# Patient Record
Sex: MALE
[De-identification: names, ages, dates, MRNs, and addresses within clinical notes are randomized per-mention and may not be internally consistent; named-entity substitution may affect disease eponyms.]

---

## 2017-07-11 ENCOUNTER — APPOINTMENT (OUTPATIENT)
Dept: ORTHOPEDIC SURGERY | Facility: CLINIC | Age: 57
End: 2017-07-11

## 2017-07-11 DIAGNOSIS — M25.552 PAIN IN LEFT HIP: ICD-10-CM

## 2017-07-11 DIAGNOSIS — M25.562 PAIN IN LEFT KNEE: ICD-10-CM

## 2017-07-11 RX ORDER — METHYLPREDNISOLONE 4 MG/1
4 TABLET ORAL
Qty: 1 | Refills: 0 | Status: ACTIVE | COMMUNITY
Start: 2017-07-11 | End: 1900-01-01

## 2017-07-16 ENCOUNTER — EMERGENCY (EMERGENCY)
Facility: HOSPITAL | Age: 57
LOS: 0 days | Discharge: HOME | End: 2017-07-16
Attending: EMERGENCY MEDICINE | Admitting: EMERGENCY MEDICINE
Payer: COMMERCIAL

## 2017-07-16 VITALS
SYSTOLIC BLOOD PRESSURE: 155 MMHG | DIASTOLIC BLOOD PRESSURE: 93 MMHG | HEART RATE: 110 BPM | TEMPERATURE: 98 F | WEIGHT: 177.91 LBS | OXYGEN SATURATION: 100 % | RESPIRATION RATE: 17 BRPM | HEIGHT: 73 IN

## 2017-07-16 DIAGNOSIS — M54.32 SCIATICA, LEFT SIDE: ICD-10-CM

## 2017-07-16 PROCEDURE — 99284 EMERGENCY DEPT VISIT MOD MDM: CPT

## 2017-07-16 RX ORDER — DEXAMETHASONE 0.5 MG/5ML
6 ELIXIR ORAL ONCE
Qty: 0 | Refills: 0 | Status: COMPLETED | OUTPATIENT
Start: 2017-07-16 | End: 2017-07-16

## 2017-07-16 RX ORDER — SODIUM CHLORIDE 9 MG/ML
1000 INJECTION INTRAMUSCULAR; INTRAVENOUS; SUBCUTANEOUS ONCE
Qty: 0 | Refills: 0 | Status: COMPLETED | OUTPATIENT
Start: 2017-07-16 | End: 2017-07-16

## 2017-07-16 RX ORDER — MORPHINE SULFATE 50 MG/1
4 CAPSULE, EXTENDED RELEASE ORAL ONCE
Qty: 0 | Refills: 0 | Status: DISCONTINUED | OUTPATIENT
Start: 2017-07-16 | End: 2017-07-16

## 2017-07-16 RX ORDER — ONDANSETRON 8 MG/1
4 TABLET, FILM COATED ORAL ONCE
Qty: 0 | Refills: 0 | Status: COMPLETED | OUTPATIENT
Start: 2017-07-16 | End: 2017-07-16

## 2017-07-16 RX ORDER — KETOROLAC TROMETHAMINE 30 MG/ML
30 SYRINGE (ML) INJECTION ONCE
Qty: 0 | Refills: 0 | Status: DISCONTINUED | OUTPATIENT
Start: 2017-07-16 | End: 2017-07-16

## 2017-07-16 RX ADMIN — Medication 6 MILLIGRAM(S): at 12:12

## 2017-07-16 RX ADMIN — Medication 30 MILLIGRAM(S): at 12:23

## 2017-07-16 RX ADMIN — MORPHINE SULFATE 4 MILLIGRAM(S): 50 CAPSULE, EXTENDED RELEASE ORAL at 13:08

## 2017-07-16 RX ADMIN — SODIUM CHLORIDE 1000 MILLILITER(S): 9 INJECTION INTRAMUSCULAR; INTRAVENOUS; SUBCUTANEOUS at 12:13

## 2017-07-16 RX ADMIN — ONDANSETRON 4 MILLIGRAM(S): 8 TABLET, FILM COATED ORAL at 13:08

## 2017-07-16 RX ADMIN — Medication 30 MILLIGRAM(S): at 12:12

## 2017-07-16 NOTE — ED PROVIDER NOTE - OBJECTIVE STATEMENT
56 y/o M presents to the ED c/o back pain. The pt provides that he started having back pain since 5 days which gradually increased, which radiates down his left leg associated with numbness. Pt notes that he took a pack of steroids, which hasn't helped. No h/o trauma, headache, fever, chills, dizziness, cp, cough, sob, abd pain, nvd, or urinary incontinence.

## 2017-07-16 NOTE — ED PROVIDER NOTE - CONSTITUTIONAL, MLM
normal... Mod distress secondary to pain, anxious, awake, alert, oriented to person, place, time/situation

## 2017-07-16 NOTE — ED PROVIDER NOTE - MEDICAL DECISION MAKING DETAILS
56 y/o M c/o back pain, to obtain pain control, xray, reassess. 56 y/o M c/o back pain, to obtain pain control, xray, reassess.  No red flags for cord compression, epidural abscess, or epidural hematoma.

## 2017-07-16 NOTE — ED PROVIDER NOTE - CHPI ED SYMPTOMS NEG
no chills/no blood in stool/no burning urination/no diarrhea/no fever/no dysuria/no vomiting/no abdominal distension/no nausea/no hematuria

## 2017-07-16 NOTE — ED ADULT NURSE NOTE - CHPI ED SYMPTOMS NEG
no vomiting/no decreased eating/drinking/no numbness/no weakness/no chills/no dizziness/no fever/no nausea/no tingling

## 2017-07-16 NOTE — ED PROVIDER NOTE - RADIATION
IP Acute Occupational Therapy Initial Evaluation  Plan of Care Note    Diagnosis:  1. Acute on chronic diastolic congestive heart failure    2. Acute exacerbation of chronic obstructive pulmonary disease (COPD)    3. Pulmonary emphysema, unspecified emphysema type        Co-morbidities:   Patient Active Problem List   Diagnosis   • Coronary artery disease involving native coronary artery of native heart without angina pectoris   • Benign hypertension   • Pure hypercholesterolemia   • Acquired hypothyroidism   • Attention deficit hyperactivity disorder (ADHD), predominantly inattentive type   • Bipolar depression   • Chronic idiopathic gout of multiple sites   • GERD without esophagitis   • Emphysema/COPD   • Pain syndrome, chronic   • Spinal stenosis of lumbar region   • Fibromyalgia   • On postmenopausal hormone replacement therapy   • Anxiety   • Rosacea   • Encounter for long-term current use of high risk medication   • Chronic Lumbar facet joint pain       Precautions:       Below is key subjective and objective information from the last 24 hours.  For further details, please refer to the OT Assess/Treat/Goals flowsheet.    Subjective:       Prior Living Situation:  Type of Home: House (02/12/17 1000)  Lives With: Adult children (02/12/17 1000)    Objective:  ADLs:  Self Cares/ADL's  Grooming Assistance: Modified independent;Standing at sink (02/12/17 1000)  Grooming/Oral Hygiene Deficit: Teeth care (02/12/17 1000)    Household Mobility:  Household Mobility  Supine to Sit: Independent (02/12/17 1000)  Sit to Supine: Independent (02/12/17 1000)  Sit to Stand: Supervision (02/12/17 1000)  Stand to Sit: Supervision (02/12/17 1000)  Sitting - Static: Independent (02/12/17 1000)  Sitting - Dynamic: Independent (02/12/17 1000)  Standing - Static: Supervision (02/12/17 1000)  Standing - Dynamic: Supervision (02/12/17 1000)    Home Management:       Education:   On this date, the patient was educated on role of OT.     The response to education was: Verbalizes understanding.    Task Modification: clinical decision making of low complexity, no task modification    Assessment:  Patient presents at baseline with concerns which was independent with mobility  and ADLs . Emphasis of session included OT evaluation and ADL standing at sink.  No concerns with functional mobility or ability to participate in ADLs.  Patient reported that less than 1x/month, her pain is so bad that she needs her daughter to help with dressing/shower activities and that she struggles to get up/down the stairs.  She reported that this is very infrequent and only due to pain.  Skilled occupational therapy services are not indicated at this time.         Recommendations for Discharge: OT: Home    Goals:       Plan:        Planned Interventions:       Equipment:          Treatment Time:  OT Time Spent: 12 minutes (02/12/17 1000)          Note sent for required physician co-signature: No          Is the patient currently receiving skilled home care services (RN or therapy)?No           Therapy Diagnosis: Weakness    Amount:  0-30 minutes  Frequency:     Duration: 1 day    Billing Information:    Timed Procedures:    ,  ,  ,  ,  , ,  ,  ,  ,  ,  ,  ,  ,  ,  ,    Untimed Procedures:   ,  ,  ,   ,   ,  ,  ,  ,  ,       G-Codes:   ,  ,  ,  ,  ,  ,  ,  ,  , $ Self Care - Current Status: CI (02/12/17 1000), $ Self Care - Goal: CI (02/12/17 1000), $ Self Care - Discharge Status: CI (02/12/17 1000),  ,  ,  ,  ,  ,      Total Treatment Time:  OT Time Spent: 12 minutes (02/12/17 1000)    Timed Treatment Minutes:       The co-signature indicates that the physician certifies the need for OT furnished under this plan of treatment while under his/her care.     leg

## 2017-07-16 NOTE — ED ADULT NURSE REASSESSMENT NOTE - NS ED NURSE REASSESS COMMENT FT1
Pt able to ambulate to bathroom, states he feels much better, states pain is intermittent and 3/10.  States he's ready for dc.

## 2017-07-16 NOTE — ED PROVIDER NOTE - MUSCULOSKELETAL, MLM
Left lumbar tenderness to palpation, w/ spasm, associated medial upper thigh numbness and tingling, +SLR on left leg, Moderately decreased ROM secondary to pain, not able to flex or extend left leg.

## 2017-07-19 ENCOUNTER — FORM ENCOUNTER (OUTPATIENT)
Age: 57
End: 2017-07-19

## 2017-07-19 ENCOUNTER — APPOINTMENT (OUTPATIENT)
Dept: ORTHOPEDIC SURGERY | Facility: CLINIC | Age: 57
End: 2017-07-19

## 2017-07-19 DIAGNOSIS — M54.32 SCIATICA, LEFT SIDE: ICD-10-CM

## 2017-07-19 RX ORDER — MELOXICAM 15 MG/1
15 TABLET ORAL
Qty: 20 | Refills: 0 | Status: ACTIVE | COMMUNITY
Start: 2017-07-14

## 2017-07-19 RX ORDER — PREDNISONE 50 MG/1
50 TABLET ORAL
Qty: 5 | Refills: 0 | Status: ACTIVE | COMMUNITY
Start: 2017-07-16

## 2017-07-19 RX ORDER — CYCLOBENZAPRINE HYDROCHLORIDE 10 MG/1
10 TABLET, FILM COATED ORAL
Qty: 10 | Refills: 0 | Status: ACTIVE | COMMUNITY
Start: 2017-07-14

## 2017-07-19 RX ORDER — OXYCODONE AND ACETAMINOPHEN 5; 325 MG/1; MG/1
5-325 TABLET ORAL
Qty: 16 | Refills: 0 | Status: ACTIVE | COMMUNITY
Start: 2017-07-16

## 2017-07-20 ENCOUNTER — OUTPATIENT (OUTPATIENT)
Dept: OUTPATIENT SERVICES | Facility: HOSPITAL | Age: 57
LOS: 1 days | End: 2017-07-20
Payer: COMMERCIAL

## 2017-07-20 ENCOUNTER — APPOINTMENT (OUTPATIENT)
Dept: MRI IMAGING | Facility: CLINIC | Age: 57
End: 2017-07-20

## 2017-07-20 DIAGNOSIS — Z00.8 ENCOUNTER FOR OTHER GENERAL EXAMINATION: ICD-10-CM

## 2017-07-20 PROCEDURE — 72148 MRI LUMBAR SPINE W/O DYE: CPT

## 2017-07-24 ENCOUNTER — OTHER (OUTPATIENT)
Age: 57
End: 2017-07-24

## 2019-07-02 NOTE — ED PROVIDER NOTE - DATE/TIME 1
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
Note Text (......Xxx Chief Complaint.): This diagnosis correlates with the
Detail Level: Zone
16-Jul-2017 14:46